# Patient Record
Sex: FEMALE | HISPANIC OR LATINO | URBAN - METROPOLITAN AREA
[De-identification: names, ages, dates, MRNs, and addresses within clinical notes are randomized per-mention and may not be internally consistent; named-entity substitution may affect disease eponyms.]

---

## 2022-03-15 ENCOUNTER — OFFICE VISIT (OUTPATIENT)
Dept: URBAN - METROPOLITAN AREA CLINIC 10 | Facility: CLINIC | Age: 67
End: 2022-03-15
Payer: MEDICAID

## 2022-03-15 DIAGNOSIS — H26.492 OTHER SECONDARY CATARACT, LEFT EYE: ICD-10-CM

## 2022-03-15 DIAGNOSIS — E11.3513 DIABETES MELLITUS TYPE 2 WITH PROLIFERATIVE RETINOPATHY WITH MACULAR EDEMA, BILATERAL: Primary | ICD-10-CM

## 2022-03-15 DIAGNOSIS — Z79.84 LONG TERM (CURRENT) USE OF ORAL HYPOGLYCEMIC DRUGS: ICD-10-CM

## 2022-03-15 PROCEDURE — 92004 COMPRE OPH EXAM NEW PT 1/>: CPT | Performed by: OPTOMETRIST

## 2022-03-15 PROCEDURE — 92134 CPTRZ OPH DX IMG PST SGM RTA: CPT | Performed by: OPTOMETRIST

## 2022-03-15 ASSESSMENT — INTRAOCULAR PRESSURE
OS: 18
OD: 18

## 2022-03-15 ASSESSMENT — VISUAL ACUITY
OD: CF 2FT
OS: CF 2FT

## 2022-03-15 NOTE — IMPRESSION/PLAN
Impression: Combined forms of age-related cataract, right eye: H25.811. Plan:  Discussed cataract diagnosis with the patient. Discussed and reviewed treatment options for cataracts. Surgical treatment is required for cataracts. Risks and benefits of surgical treatment were discussed and understood. Patient elects surgical treatment. Recommend surgery OS. Needs Retina Clearance. --Will begin cat sx planning. Standard IOL c no upgrades. --Vit heme is limiting. PPV may be indicated.

## 2022-03-15 NOTE — IMPRESSION/PLAN
Impression: Long term (current) use of oral hypoglycemic drugs: Z79.84. Plan: See above plan. Stressed importance of BG control.

## 2022-03-15 NOTE — IMPRESSION/PLAN
Impression: Other secondary cataract, left eye: H26.492. Plan: Mild PCO. No treatment currently recommended. The patient will monitor vision changes and contact us with any decrease in vision.

## 2022-03-15 NOTE — IMPRESSION/PLAN
Impression: Diabetes mellitus Type 2 with proliferative retinopathy with macular edema, bilateral: T14.9526. Plan: Pt. was receiving care in [facility] and is s/p PRP OU. Vit heme and DME OU present. Consult retina for evaluation and management.

## 2022-04-11 ENCOUNTER — OFFICE VISIT (OUTPATIENT)
Dept: URBAN - METROPOLITAN AREA CLINIC 10 | Facility: CLINIC | Age: 67
End: 2022-04-11
Payer: MEDICAID

## 2022-04-11 DIAGNOSIS — H25.811 COMBINED FORMS OF AGE-RELATED CATARACT, RIGHT EYE: ICD-10-CM

## 2022-04-11 DIAGNOSIS — Z79.84 LONG TERM (CURRENT) USE OF ORAL HYPOGLYCEMIC DRUGS: ICD-10-CM

## 2022-04-11 PROCEDURE — 99214 OFFICE O/P EST MOD 30 MIN: CPT | Performed by: OPTOMETRIST

## 2022-04-11 RX ORDER — BROMFENAC 1.03 MG/ML
0.09 % SOLUTION/ DROPS OPHTHALMIC
Qty: 15 | Refills: 3 | Status: INACTIVE
Start: 2022-04-11 | End: 2022-04-11

## 2022-04-11 ASSESSMENT — INTRAOCULAR PRESSURE
OD: 18
OS: 18

## 2022-04-11 NOTE — IMPRESSION/PLAN
Impression: Diabetes mellitus Type 2 with proliferative retinopathy with macular edema, bilateral: G20.8525. Plan: Pt. was receiving care in [facility], [place] and is s/p PRP OU. Photos today. Vit heme and DME OU present. Pt is on zebested gts, presumed for DME but pt also states helps with pain. Pt would like to continue, states improvement with gt. May use Bromfenac, Rx sent. Consult retina for evaluation and management.

## 2022-04-11 NOTE — IMPRESSION/PLAN
Impression: Diabetes mellitus Type 2 with proliferative retinopathy with macular edema, bilateral: I79.1055. Plan: Pt. was receiving care in [facility] and is s/p PRP OU. Vit heme and DME OU present. Consult retina for evaluation and management.

## 2022-04-15 ENCOUNTER — OFFICE VISIT (OUTPATIENT)
Dept: URBAN - METROPOLITAN AREA CLINIC 10 | Facility: CLINIC | Age: 67
End: 2022-04-15
Payer: MEDICAID

## 2022-04-15 DIAGNOSIS — E11.3513 TYPE 2 DIABETES MELLITUS WITH PROLIFERATIVE DIABETIC RETINOPATHY WITH MACULAR EDEMA, BILATERAL: Primary | ICD-10-CM

## 2022-04-15 PROCEDURE — 92134 CPTRZ OPH DX IMG PST SGM RTA: CPT | Performed by: OPHTHALMOLOGY

## 2022-04-15 PROCEDURE — 99204 OFFICE O/P NEW MOD 45 MIN: CPT | Performed by: OPHTHALMOLOGY

## 2022-04-15 RX ORDER — DICLOFENAC SODIUM 1 MG/ML
0.1 % SOLUTION/ DROPS OPHTHALMIC
Qty: 10 | Refills: 4 | Status: ACTIVE
Start: 2022-04-15

## 2022-04-15 NOTE — IMPRESSION/PLAN
Impression: DM2  E38.6505 LOST to f/u 6y. .. WORSE
  DME and VIT HMG OD>OS Plan: High-resolution imaging / retinal exam confirm severe proliferative DM retinopathy -- [address] NOT get injections [place] [address] Then began some injections last couple years. This is advanced disease. Injx/laser/surgery may be indicated. RETINA will provide vigilant attention to the retinal issues. Non-retinal care w primary eye team.  Pt MUST follow strict rec's for DM  care TODAY LONG d/w pt. SEVERE issue w HMG and DM
   URGED pt to control BG better. Long d/w pt in [facility] [place] TODAY Avastin OU injx. Future ND? RTC 4-6w Pos FA baseline and plan Avastin OU #2/4 AFTER multiple injx may need VIT / Laser OS and then OD OK proceed w plan Ce/IOL OD w [facility] first.

## 2022-04-15 NOTE — IMPRESSION/PLAN
Impression: Cataract, right eye: H25.811. Plan: Discussed cataract diagnosis with the patient.  OK proceed Ce/IOL OD

## 2022-05-09 ENCOUNTER — TESTING ONLY (OUTPATIENT)
Dept: URBAN - METROPOLITAN AREA CLINIC 10 | Facility: CLINIC | Age: 67
End: 2022-05-09
Payer: MEDICAID

## 2022-05-09 DIAGNOSIS — H25.811 COMBINED FORMS OF AGE-RELATED CATARACT, RIGHT EYE: ICD-10-CM

## 2022-05-09 DIAGNOSIS — Z01.818 ENCOUNTER FOR OTHER PREPROCEDURAL EXAMINATION: Primary | ICD-10-CM

## 2022-05-09 PROCEDURE — 92025 CPTRIZED CORNEAL TOPOGRAPHY: CPT | Performed by: OPHTHALMOLOGY

## 2022-05-09 PROCEDURE — 99202 OFFICE O/P NEW SF 15 MIN: CPT | Performed by: PHYSICIAN ASSISTANT

## 2022-05-10 ENCOUNTER — PRE-OPERATIVE VISIT (OUTPATIENT)
Dept: URBAN - METROPOLITAN AREA CLINIC 10 | Facility: CLINIC | Age: 67
End: 2022-05-10
Payer: MEDICAID

## 2022-05-10 DIAGNOSIS — Z79.84 LONG TERM (CURRENT) USE OF ORAL HYPOGLYCEMIC DRUGS: ICD-10-CM

## 2022-05-10 DIAGNOSIS — H26.492 OTHER SECONDARY CATARACT, LEFT EYE: ICD-10-CM

## 2022-05-10 DIAGNOSIS — H02.051 TRICHIASIS WITHOUT ENTROPION, RIGHT UPPER LID: ICD-10-CM

## 2022-05-10 PROCEDURE — 92012 INTRM OPH EXAM EST PATIENT: CPT | Performed by: OPHTHALMOLOGY

## 2022-05-10 RX ORDER — DICLOFENAC SODIUM 1 MG/ML
0.1 % SOLUTION/ DROPS OPHTHALMIC
Qty: 5 | Refills: 2 | Status: ACTIVE
Start: 2022-05-10

## 2022-05-10 RX ORDER — PREDNISOLONE ACETATE 10 MG/ML
1 % SUSPENSION/ DROPS OPHTHALMIC
Qty: 5 | Refills: 2 | Status: ACTIVE
Start: 2022-05-10

## 2022-05-10 ASSESSMENT — INTRAOCULAR PRESSURE
OS: 17
OD: 17

## 2022-05-10 ASSESSMENT — VISUAL ACUITY: OS: 20/30

## 2022-05-10 NOTE — IMPRESSION/PLAN
Impression: Diabetes mellitus Type 2 with proliferative retinopathy with macular edema, bilateral: R05.2382. Plan: Pt. was receiving care in [facility] and is s/p PRP OU. Followed by Retina - Vit heme and DME OU. Discussed diet, exercise, nutrition. Good blood sugar and blood pressure control. Maintain follow up with PCP. Discussed with patient, understands this may limit vision after surgery.

## 2022-05-10 NOTE — IMPRESSION/PLAN
Impression: Combined forms of age-related cataract, right eye: H25.811. Plan: PLAN: (( AIM -0.25 OD : 2nd eye. INJECTABLE OD  PLUS GTTS ( BDR) (DEXYCU 1st choice), NO ORA OD, NO LRI OD: irreg austyn , SPEAKS Gren[place] , s/p injections/laser OU ; Vit heme OU, retina clearance done ; LIKELY TRYPAN - POSSIBLE PRIOR TRAUMA? - PRIOR INJECTION TRACT? , Have Vitrector and Sulcus IOL available  ; NOTE LENS TYPE - [id number] )) Discussed cataract diagnosis with the patient. Appropriate testing ordered for cataract diagnosis prior to Preop. Risks and benefits of surgical treatment were discussed and understood. Patient desires surgical treatment. Premium options discussed but patient declines and is ok with using glasses after surgery. Patient desires to proceed with surgery OD, 2nd eye. Both eyes examined, medically necessary due to impact in activities of daily living. Discussed with pt the need for glasses after surgery. Discussed there is a chance of developing capsular haze after surgery, which may be corrected with laser/yag after surgery. discussed higher risk for complications due to prior injections/laser for DM. Understands Vit heme will still limit VA post sx. Discussed suspicious for prior injury right eye and higher risks        PRESENT.

## 2022-05-10 NOTE — IMPRESSION/PLAN
Impression: Trichiasis without entropion, right upper lid Plan: use ATs. none today   Discussed with patient, understands this may limit vision after surgery.

## 2022-05-17 ENCOUNTER — SURGERY (OUTPATIENT)
Dept: URBAN - METROPOLITAN AREA SURGERY 5 | Facility: SURGERY | Age: 67
End: 2022-05-17
Payer: MEDICAID

## 2022-05-17 PROCEDURE — 66982 XCAPSL CTRC RMVL CPLX WO ECP: CPT | Performed by: OPHTHALMOLOGY

## 2022-05-18 ENCOUNTER — POST-OPERATIVE VISIT (OUTPATIENT)
Dept: URBAN - METROPOLITAN AREA CLINIC 10 | Facility: CLINIC | Age: 67
End: 2022-05-18

## 2022-05-18 DIAGNOSIS — Z96.1 PRESENCE OF INTRAOCULAR LENS: Primary | ICD-10-CM

## 2022-05-18 PROCEDURE — 99024 POSTOP FOLLOW-UP VISIT: CPT | Performed by: OPTOMETRIST

## 2022-05-18 ASSESSMENT — INTRAOCULAR PRESSURE: OD: 16

## 2022-05-18 NOTE — IMPRESSION/PLAN
Impression: S/P Cataract Extraction/IOL OD - 1 Day. Presence of intraocular lens  Z96.1. Excellent post op course   Post operative instructions reviewed - Plan: RTC as scheduled retina and PO visit.  --Continue all meds

## 2022-05-20 ENCOUNTER — OFFICE VISIT (OUTPATIENT)
Dept: URBAN - METROPOLITAN AREA CLINIC 10 | Facility: CLINIC | Age: 67
End: 2022-05-20
Payer: MEDICAID

## 2022-05-20 DIAGNOSIS — E11.3513 TYPE 2 DIABETES MELLITUS WITH PROLIFERATIVE DIABETIC RETINOPATHY WITH MACULAR EDEMA, BILATERAL: Primary | ICD-10-CM

## 2022-05-20 PROCEDURE — 92235 FLUORESCEIN ANGRPH MLTIFRAME: CPT | Performed by: OPHTHALMOLOGY

## 2022-05-20 PROCEDURE — 92134 CPTRZ OPH DX IMG PST SGM RTA: CPT | Performed by: OPHTHALMOLOGY

## 2022-05-20 PROCEDURE — 92250 FUNDUS PHOTOGRAPHY W/I&R: CPT | Performed by: OPHTHALMOLOGY

## 2022-05-20 ASSESSMENT — INTRAOCULAR PRESSURE
OS: 18
OD: 13

## 2022-05-20 NOTE — IMPRESSION/PLAN
Impression: DM2  V43.5070 LOST to f/u 6y. .. WORSE DM
  DME and VIT HMG OD>OS Plan: hx: [[SEVERE PDR DM retinopathy -- DID NOT get inj for 4YR! [address] Then began some inj last couple yrs ? Advanced DM disease. RETINA will resume vigilant care Pt MUST follow strict rec's for DM  care -- RESUME injx / Retina care Apr '22]] TODAY REPEATED LONG d/w pt. SEVERE issue w HMG and DM
   TODAY Avastin OU inj (proc note). Future exam ?  
   RTC 4-6w ND/inj/OCT plan Avastin OU #3/3 THEN likely plan for VIT / Laser OD and then Vit/Laser OS

## 2022-07-05 ENCOUNTER — OFFICE VISIT (OUTPATIENT)
Dept: URBAN - METROPOLITAN AREA CLINIC 10 | Facility: CLINIC | Age: 67
End: 2022-07-05
Payer: MEDICAID

## 2022-07-05 DIAGNOSIS — E11.3513 TYPE 2 DIABETES MELLITUS WITH PROLIFERATIVE DIABETIC RETINOPATHY WITH MACULAR EDEMA, BILATERAL: Primary | ICD-10-CM

## 2022-07-05 DIAGNOSIS — H25.811 COMBINED FORMS OF AGE-RELATED CATARACT, RIGHT EYE: ICD-10-CM

## 2022-07-05 DIAGNOSIS — H43.13 VITREOUS HEMORRHAGE, BILATERAL: ICD-10-CM

## 2022-07-05 PROCEDURE — 92014 COMPRE OPH EXAM EST PT 1/>: CPT | Performed by: OPHTHALMOLOGY

## 2022-07-05 PROCEDURE — 92134 CPTRZ OPH DX IMG PST SGM RTA: CPT | Performed by: OPHTHALMOLOGY

## 2022-07-05 ASSESSMENT — INTRAOCULAR PRESSURE
OD: 13
OS: 14

## 2022-07-05 NOTE — IMPRESSION/PLAN
Impression: DM2  Q81.8338 LOST to f/u 6y. .. WORSE DM
  DME and VIT HMG OD>OS Plan: hx: [[SEVERE PDR DM retinopathy -- DID NOT get inj for 4YR! [address] Then began some inj last couple yrs ? Advanced DM disease. RETINA will resume vigilant care Pt MUST follow strict rec's for DM  care -- RESUME injx / Retina care Apr '22]] TODAY REPEATED LONG d/w pt. SEVERE issue w HMG and DM
   TODAY Avastin OU inj (proc note). DONE w #3/3 injx Summer '22    RTC plan for VIT / Laser OD ([id number])  and then Vit/Laser OS to follow

## 2022-07-05 NOTE — IMPRESSION/PLAN
Impression: Vitreous hemorrhage Plan: OD > OS DM Hmg -- DONE w  Ce/IOL OD -- OK plan VIT to remove Hmg apply Mod PRP laser -- see other plan

## 2022-07-15 ENCOUNTER — ADULT PHYSICAL (OUTPATIENT)
Dept: URBAN - METROPOLITAN AREA CLINIC 10 | Facility: CLINIC | Age: 67
End: 2022-07-15
Payer: MEDICAID

## 2022-07-15 DIAGNOSIS — Z01.818 ENCOUNTER FOR OTHER PREPROCEDURAL EXAMINATION: Primary | ICD-10-CM

## 2022-07-15 DIAGNOSIS — H43.13 VITREOUS HEMORRHAGE, BILATERAL: ICD-10-CM

## 2022-07-15 DIAGNOSIS — E11.3513 TYPE 2 DIABETES MELLITUS WITH PROLIFERATIVE DIABETIC RETINOPATHY WITH MACULAR EDEMA, BILATERAL: ICD-10-CM

## 2022-07-15 PROCEDURE — 99213 OFFICE O/P EST LOW 20 MIN: CPT | Performed by: PHYSICIAN ASSISTANT

## 2022-07-22 ENCOUNTER — POST-OPERATIVE VISIT (OUTPATIENT)
Dept: URBAN - METROPOLITAN AREA CLINIC 10 | Facility: CLINIC | Age: 67
End: 2022-07-22
Payer: MEDICAID

## 2022-07-22 DIAGNOSIS — Z48.810 ENCOUNTER FOR SURGICAL AFTERCARE FOLLOWING SURGERY ON A SENSE ORGAN: Primary | ICD-10-CM

## 2022-07-22 PROCEDURE — 99024 POSTOP FOLLOW-UP VISIT: CPT | Performed by: STUDENT IN AN ORGANIZED HEALTH CARE EDUCATION/TRAINING PROGRAM

## 2022-07-22 ASSESSMENT — INTRAOCULAR PRESSURE: OD: 11

## 2022-07-22 NOTE — IMPRESSION/PLAN
Impression: S/P Posterior Vitrectomy (PPVIT)/laser/Capsulotomy/Avastin/hmg rmvl/partaial air OD - 1 Day. Encounter for surgical aftercare following surgery on a sense organ  Z48.810. Excellent post op course   Post operative instructions reviewed - Plan: Reviewed post-op instructions. RTC if any pain or sudden changes to vision.

## 2022-08-05 ENCOUNTER — OFFICE VISIT (OUTPATIENT)
Dept: URBAN - METROPOLITAN AREA CLINIC 10 | Facility: CLINIC | Age: 67
End: 2022-08-05
Payer: MEDICAID

## 2022-08-05 DIAGNOSIS — E11.3513 TYPE 2 DIABETES MELLITUS WITH PROLIFERATIVE DIABETIC RETINOPATHY WITH MACULAR EDEMA, BILATERAL: Primary | ICD-10-CM

## 2022-08-05 DIAGNOSIS — H43.13 VITREOUS HEMORRHAGE, BILATERAL: ICD-10-CM

## 2022-08-05 PROCEDURE — 92134 CPTRZ OPH DX IMG PST SGM RTA: CPT | Performed by: OPHTHALMOLOGY

## 2022-08-05 PROCEDURE — 99024 POSTOP FOLLOW-UP VISIT: CPT | Performed by: OPHTHALMOLOGY

## 2022-08-05 ASSESSMENT — INTRAOCULAR PRESSURE
OS: 15
OD: 15

## 2022-08-05 NOTE — IMPRESSION/PLAN
Impression: DM2  H36.0800 LOST to f/u 6y. .. WORSE DM
  DME and VIT HMG OD>OS
   CLEAR s/p VIT Laser OD July '22 Plan: hx: [[SEVERE PDR DM retinopathy -- DID NOT get inj for 4YR! [address]  Then began some inj last couple yrs ? Advanced DM disease. RETINA will resume vigilant care Pt MUST follow strict rec's for DM  care -- RESUME injx / Retina care Apr '22]] REPEATED LONG d/w pt. SEVERE issue w HMG and DM -- Avastin OU prior CLEAR s/p VIT Laser OD July '22 IMPROVED OD. OK address 2nd eye w HMG OS now.   
   RTC plan for VIT / Laser/part Air/ Avastin OS to follow -- 2nd eye

## 2022-08-05 NOTE — IMPRESSION/PLAN
Impression: Vitreous hemorrhage   CLEAR s/p VIT Laser OD Plan: OD > OS DM Hmg -- DONE w  Ce/IOL OD -- OK plan VIT to remove Hmg apply Mod PRP laser -- see other plan --   CLEAR s/p VIT Laser OD July '22

## 2022-09-16 ENCOUNTER — POST-OPERATIVE VISIT (OUTPATIENT)
Dept: URBAN - METROPOLITAN AREA CLINIC 10 | Facility: CLINIC | Age: 67
End: 2022-09-16
Payer: MEDICAID

## 2022-09-16 DIAGNOSIS — Z48.810 ENCOUNTER FOR SURGICAL AFTERCARE FOLLOWING SURGERY ON A SENSE ORGAN: Primary | ICD-10-CM

## 2022-09-16 PROCEDURE — 99024 POSTOP FOLLOW-UP VISIT: CPT | Performed by: OPTOMETRIST

## 2022-09-16 ASSESSMENT — INTRAOCULAR PRESSURE: OS: 14

## 2022-09-16 NOTE — IMPRESSION/PLAN
Impression: S/P Posterior Vitrectomy (PPVIT)/laser/ capsulotomy/ partial air/ Avastin OS - 1 Day. Encounter for surgical aftercare following surgery on a sense organ  Z48.810. Plan: Reviewed nose down positioning x 24 hrs. Begin ofloxacin tid x 1 wk, pred acetate tid x 1 wk then taper weekly. RTC as scheduled c Dr. Timmy Gómez.

## 2022-10-14 ENCOUNTER — OFFICE VISIT (OUTPATIENT)
Dept: URBAN - METROPOLITAN AREA CLINIC 10 | Facility: CLINIC | Age: 67
End: 2022-10-14

## 2022-10-14 DIAGNOSIS — H43.13 VITREOUS HEMORRHAGE, BILATERAL: ICD-10-CM

## 2022-10-14 DIAGNOSIS — E11.3513 TYPE 2 DIABETES MELLITUS WITH PROLIFERATIVE DIABETIC RETINOPATHY WITH MACULAR EDEMA, BILATERAL: Primary | ICD-10-CM

## 2022-10-14 PROCEDURE — 92134 CPTRZ OPH DX IMG PST SGM RTA: CPT | Performed by: OPHTHALMOLOGY

## 2022-10-14 PROCEDURE — 99024 POSTOP FOLLOW-UP VISIT: CPT | Performed by: OPHTHALMOLOGY

## 2022-10-14 ASSESSMENT — INTRAOCULAR PRESSURE
OS: 18
OD: 15

## 2022-10-14 NOTE — IMPRESSION/PLAN
Impression: DM2  E52.7062 LOST to f/u 6y. .. WORSE DM
  DME and VIT HMG OD>OS
   CLEAR s/p VIT Laser OD July '22 Plan: hx: [[SEVERE PDR DM retinopathy -DID NOT get inj for 4YR! in [address]  Then some inj last couple yrs ? Advanced DM disease. RETINA will resume vigilant care Pt MUST follow strict rec's for DM  care -- RESUME inj / Retina care Apr '22]] REPEATED LONG d/w pt. SEVERE issue w HMG and DM -- Avastin OU prior CLEAR s/p VIT Laser OD July '22 then VIT Lsr [facility] Sep '22 IMPROVED OD. IMPROVED OS -- PT THRILLED and grateful. TODAY postop   NO CME postop -- IMPROVED. Finish Gtts. RETINA f/u PRN -- F/u Gen eye care 4-6w.

## 2022-10-14 NOTE — IMPRESSION/PLAN
Impression: Vitreous hemorrhage   CLEAR s/p VIT Laser OD / OS Plan: OD > OS DM Hmg -- DONE w  Ce/IOL OD -- OK plan VIT to remove Hmg apply Mod PRP laser -- see other plan --   CLEAR s/p VIT Laser OD July '22  and OS Sep '22

## 2022-11-11 ENCOUNTER — OFFICE VISIT (OUTPATIENT)
Dept: URBAN - METROPOLITAN AREA CLINIC 10 | Facility: CLINIC | Age: 67
End: 2022-11-11
Payer: MEDICAID

## 2022-11-11 DIAGNOSIS — E11.3513 TYPE 2 DIABETES MELLITUS WITH PROLIFERATIVE DIABETIC RETINOPATHY WITH MACULAR EDEMA, BILATERAL: Primary | ICD-10-CM

## 2022-11-11 DIAGNOSIS — H02.051 TRICHIASIS WITHOUT ENTROPION, RIGHT UPPER LID: ICD-10-CM

## 2022-11-11 PROCEDURE — 99213 OFFICE O/P EST LOW 20 MIN: CPT | Performed by: OPTOMETRIST

## 2022-11-11 ASSESSMENT — INTRAOCULAR PRESSURE
OS: 15
OD: 10

## 2022-11-11 ASSESSMENT — VISUAL ACUITY
OD: 20/50
OS: 20/20

## 2022-11-11 NOTE — IMPRESSION/PLAN
Impression: DM2  V99.1294 LOST to f/u 6y. .. WORSE DM
  DME and VIT HMG OD>OS
   CLEAR s/p VIT Laser OD July '22 Plan: hx: [[SEVERE PDR DM retinopathy -DID NOT get inj for 4YR! in [address]  Then some inj last couple yrs ? Advanced DM disease. RETINA will resume vigilant care Pt MUST follow strict rec's for DM  care -- RESUME inj / Retina care Apr '22]] Stable and improved. Pt. finished PO gtts. and will use OTC readers. RTC 3-4 months for complete and order Mac OCT.

## 2022-12-08 ENCOUNTER — OFFICE VISIT (OUTPATIENT)
Dept: URBAN - METROPOLITAN AREA CLINIC 10 | Facility: CLINIC | Age: 67
End: 2022-12-08
Payer: MEDICAID

## 2022-12-08 DIAGNOSIS — E11.3513 TYPE 2 DIABETES MELLITUS WITH PROLIFERATIVE DIABETIC RETINOPATHY WITH MACULAR EDEMA, BILATERAL: Primary | ICD-10-CM

## 2022-12-08 PROCEDURE — 92134 CPTRZ OPH DX IMG PST SGM RTA: CPT | Performed by: OPTOMETRIST

## 2022-12-08 ASSESSMENT — INTRAOCULAR PRESSURE
OD: 16
OS: 14

## 2022-12-08 ASSESSMENT — VISUAL ACUITY: OS: 20/20

## 2022-12-08 NOTE — IMPRESSION/PLAN
Impression: DM2  I82.6631 LOST to f/u 6y. .. WORSE DM
  DME and VIT HMG OD>OS
   s/p VIT Laser OD July '22 Plan: hx: [[SEVERE PDR DM retinopathy -DID NOT get inj for 4YR! [address] Then some inj last couple yrs ? Advanced DM disease. RETINA will resume vigilant care Pt MUST follow strict rec's for DM  care -- RESUME inj / Retina care Apr '22]] Recurring vit heme OD. Onset of symptoms x 4 days. Will monitor for clearing now s/p PPV c PRP. Consult Dr. Arelis Costa in 2 weeks for f/u.

## 2023-01-31 ENCOUNTER — OFFICE VISIT (OUTPATIENT)
Dept: URBAN - METROPOLITAN AREA CLINIC 10 | Facility: CLINIC | Age: 68
End: 2023-01-31
Payer: MEDICAID

## 2023-01-31 DIAGNOSIS — H43.13 VITREOUS HEMORRHAGE, BILATERAL: ICD-10-CM

## 2023-01-31 DIAGNOSIS — E11.3513 TYPE 2 DIABETES MELLITUS WITH PROLIFERATIVE DIABETIC RETINOPATHY WITH MACULAR EDEMA, BILATERAL: Primary | ICD-10-CM

## 2023-01-31 PROCEDURE — 92134 CPTRZ OPH DX IMG PST SGM RTA: CPT | Performed by: OPHTHALMOLOGY

## 2023-01-31 PROCEDURE — 92014 COMPRE OPH EXAM EST PT 1/>: CPT | Performed by: OPHTHALMOLOGY

## 2023-01-31 ASSESSMENT — INTRAOCULAR PRESSURE
OD: 12
OS: 13

## 2023-01-31 NOTE — IMPRESSION/PLAN
Impression: Vitreous hemorrhage CLEAR s/p VIT Laser OD / OS Plan: OD > OS DM Hmg -- DONE w  Ce/IOL OD -- OK plan VIT to remove Hmg apply Mod PRP laser -- see other plan --   CLEAR s/p VIT Laser OD July '22  and OS Sep '22 -- NO RECUR HMG now on repeated exam.  Reassured pt.

## 2023-01-31 NOTE — IMPRESSION/PLAN
Impression: DM2 -- LOST to f/u 6y. .. WORSE DM
 DME and VIT HMG OD>OS
 CLEAR s/p VIT Laser OD Jul '22 Plan: hx: [[SEVERE PDR DM retinopathy -DID NOT get inj for 4YR! in [address]  Then some inj last couple yrs ? Advanced DM disease. RETINA will resume vigilant care Pt MUST follow strict rec's for DM  care -- RESUME inj / Retina care Apr '22]] REPEATED LONG d/w pt. SEVERE issue w HMG and DM -- Avastin OU prior CLEAR s/p VIT Lsr OD Jul '22 then VIT Lsr OS Sep '22 IMPROVED OD. IMPROVED OS -- PT THRILLED and grateful. TODAY added back on w c/o LOV (but now recovered). NO DME / CME postop -- IMPROVED. No injx / laser.     
  Reassured pt --  RETINA f/u PRN again -- F/u Gen eye care 6w

## 2023-03-23 ENCOUNTER — OFFICE VISIT (OUTPATIENT)
Dept: URBAN - METROPOLITAN AREA CLINIC 10 | Facility: CLINIC | Age: 68
End: 2023-03-23
Payer: MEDICARE

## 2023-03-23 DIAGNOSIS — H43.13 VITREOUS HEMORRHAGE, BILATERAL: Primary | ICD-10-CM

## 2023-03-23 DIAGNOSIS — E11.3513 TYPE 2 DIABETES MELLITUS WITH PROLIFERATIVE DIABETIC RETINOPATHY WITH MACULAR EDEMA, BILATERAL: ICD-10-CM

## 2023-03-23 PROCEDURE — 99213 OFFICE O/P EST LOW 20 MIN: CPT | Performed by: OPTOMETRIST

## 2023-03-23 ASSESSMENT — INTRAOCULAR PRESSURE
OS: 14
OD: 14

## 2023-03-23 NOTE — IMPRESSION/PLAN
Impression: Vitreous hemorrhage CLEAR s/p VIT Laser OD / OS Plan: Recurring vit heme OS>>OD. Pt. is s/p PPV c PRP OU. Recent h/o CVA 2/6/23. Vit heme obscuring view of any detail.  reports MRI done, CVA confirmed. Seen by neurology as well and  reports neurology specifically noted CVA did not have evidence of occipital infarct. Will consult retina again, Dr. Nena Salazar 1 week for eval/bscan.

## 2023-03-23 NOTE — IMPRESSION/PLAN
Impression: DM2 -- LOST to f/u 6y. .. WORSE DM
 DME and VIT HMG OD>OS
 CLEAR s/p VIT Laser OD Jul '22 Plan: hx: [[SEVERE PDR DM retinopathy -DID NOT get inj for 4YR! [address] Then some inj last couple yrs ? Advanced DM disease. RETINA will resume vigilant care Pt MUST follow strict rec's for DM  care -- RESUME inj / Retina care Apr '22]] See above. New vit heme OU.

## 2023-03-27 ENCOUNTER — OFFICE VISIT (OUTPATIENT)
Dept: URBAN - METROPOLITAN AREA CLINIC 10 | Facility: CLINIC | Age: 68
End: 2023-03-27
Payer: MEDICARE

## 2023-03-27 DIAGNOSIS — I63.9 STROKE: ICD-10-CM

## 2023-03-27 DIAGNOSIS — E11.3513 TYPE 2 DIABETES MELLITUS WITH PROLIFERATIVE DIABETIC RETINOPATHY WITH MACULAR EDEMA, BILATERAL: Primary | ICD-10-CM

## 2023-03-27 DIAGNOSIS — I69.398 OTHER SEQUELAE OF CEREBRAL INFARCTION: ICD-10-CM

## 2023-03-27 PROCEDURE — 92134 CPTRZ OPH DX IMG PST SGM RTA: CPT | Performed by: OPHTHALMOLOGY

## 2023-03-27 PROCEDURE — 76512 OPH US DX B-SCAN: CPT | Performed by: OPHTHALMOLOGY

## 2023-03-27 PROCEDURE — 99214 OFFICE O/P EST MOD 30 MIN: CPT | Performed by: OPHTHALMOLOGY

## 2023-03-27 ASSESSMENT — INTRAOCULAR PRESSURE
OD: 14
OS: 17

## 2023-03-27 NOTE — IMPRESSION/PLAN
Impression: Stroke: I63.9. Plan: right side paralysis, in hospital Feb 2023. ARM and LEG. Hospital 3 days.  Neurology doctor should clear for outpatient surgery, IV mild sedation only for POSTERIOR VITRECTOMY , DR CASTANEDA, surgery time 30 minutes

## 2023-04-21 ENCOUNTER — OFFICE VISIT (OUTPATIENT)
Dept: URBAN - METROPOLITAN AREA CLINIC 10 | Facility: CLINIC | Age: 68
End: 2023-04-21
Payer: MEDICARE

## 2023-04-21 DIAGNOSIS — I63.9 STROKE: ICD-10-CM

## 2023-04-21 DIAGNOSIS — H43.13 VITREOUS HEMORRHAGE, BILATERAL: ICD-10-CM

## 2023-04-21 DIAGNOSIS — E11.3513 TYPE 2 DIABETES MELLITUS WITH PROLIFERATIVE DIABETIC RETINOPATHY WITH MACULAR EDEMA, BILATERAL: Primary | ICD-10-CM

## 2023-04-21 PROCEDURE — 99214 OFFICE O/P EST MOD 30 MIN: CPT | Performed by: OPHTHALMOLOGY

## 2023-04-21 PROCEDURE — 92134 CPTRZ OPH DX IMG PST SGM RTA: CPT | Performed by: OPHTHALMOLOGY

## 2023-04-21 PROCEDURE — 67028 INJECTION EYE DRUG: CPT | Performed by: OPHTHALMOLOGY

## 2023-04-21 ASSESSMENT — INTRAOCULAR PRESSURE
OS: 22
OD: 13

## 2023-04-21 NOTE — IMPRESSION/PLAN
Impression: DM2 -- LOST to f/u 6y. .. WORSE DM
 DME and VIT HMG OD>OS
 CLEAR s/p VIT Laser OD Jul '22 RECUR HMG OS '23 w her CVA Plan: hx: [[SEVERE PDR DM retinopathy -DID NOT get inj for 4YR! [address] Then some inj last couple yrs ? Advanced DM disease. RETINA will resume vigilant care Pt MUST follow strict rec's for DM  care -- RESUME inj / Retina care Apr '22]] REPEATED LONG d/w pt. SEVERE issue w HMG and DM -- Avastin OU prior CLEAR s/p VIT Lsr OD Jul '22 then VIT Lsr OS Sep '22 IMPROVED OD. IMPROVED OS -- PT THRILLED and grateful. APR '23 added back w HMG /LOV OS (stable OD). . .  TODAY add Avst OS (proc note) MAY NEED ONGOING INJ to MAINTAIN /PREVENT RECUR HMG as per DRCr. net Appeal for Vabysmo - FAILED Avastin.   PLAN VIT for OS (See other plan)

## 2023-04-21 NOTE — IMPRESSION/PLAN
Impression: Vit HMG CLEAR s/p VIT Lsr OD / OS -- RECUR OD '22 / RECUR OS '23 Plan: OD > OS DM Hmg --  -- Prior VIT Lsr OD /OS -- s/p VIT Lsr OD Jul '22  and OS Sep '22 -- WAS EXCELLENT - but POOR care and RECUR HMG OD '22 / OS '23 Appeal for Vabysmo - FAILED Avastin. PLAN VIT for OS (See other plan) MOVE to REPEATED VIT again OS (for recur hmg '23)      Plan VIT / Rmv'l HMG OS / PRP Fill-in/Partial Afx/Avast / Aleida Novel OS

## 2023-04-21 NOTE — IMPRESSION/PLAN
Impression: Stroke: I63.9. Plan: right side paralysis, in hospital Feb 2023. ARM and LEG. Hospital 3 days. Neurology doctor care now.

## 2023-06-02 ENCOUNTER — ADULT PHYSICAL (OUTPATIENT)
Dept: URBAN - METROPOLITAN AREA CLINIC 10 | Facility: CLINIC | Age: 68
End: 2023-06-02
Payer: MEDICARE

## 2023-06-02 DIAGNOSIS — Z01.818 ENCOUNTER FOR OTHER PREPROCEDURAL EXAMINATION: Primary | ICD-10-CM

## 2023-06-02 DIAGNOSIS — E11.3513 TYPE 2 DIABETES MELLITUS WITH PROLIFERATIVE DIABETIC RETINOPATHY WITH MACULAR EDEMA, BILATERAL: ICD-10-CM

## 2023-06-02 DIAGNOSIS — H43.13 VITREOUS HEMORRHAGE, BILATERAL: ICD-10-CM

## 2023-06-02 PROCEDURE — 99213 OFFICE O/P EST LOW 20 MIN: CPT | Performed by: PHYSICIAN ASSISTANT

## 2023-06-09 ENCOUNTER — POST-OPERATIVE VISIT (OUTPATIENT)
Dept: URBAN - METROPOLITAN AREA CLINIC 10 | Facility: CLINIC | Age: 68
End: 2023-06-09
Payer: MEDICARE

## 2023-06-09 DIAGNOSIS — Z48.810 ENCOUNTER FOR SURGICAL AFTERCARE FOLLOWING SURGERY ON A SENSE ORGAN: Primary | ICD-10-CM

## 2023-06-09 PROCEDURE — 99024 POSTOP FOLLOW-UP VISIT: CPT | Performed by: STUDENT IN AN ORGANIZED HEALTH CARE EDUCATION/TRAINING PROGRAM

## 2023-06-09 ASSESSMENT — INTRAOCULAR PRESSURE: OS: 17

## 2023-06-23 ENCOUNTER — OFFICE VISIT (OUTPATIENT)
Dept: URBAN - METROPOLITAN AREA CLINIC 10 | Facility: CLINIC | Age: 68
End: 2023-06-23
Payer: MEDICARE

## 2023-06-23 DIAGNOSIS — H43.13 VITREOUS HEMORRHAGE, BILATERAL: ICD-10-CM

## 2023-06-23 DIAGNOSIS — I63.9 STROKE: ICD-10-CM

## 2023-06-23 DIAGNOSIS — E11.3513 TYPE 2 DIABETES MELLITUS WITH PROLIFERATIVE DIABETIC RETINOPATHY WITH MACULAR EDEMA, BILATERAL: Primary | ICD-10-CM

## 2023-06-23 PROCEDURE — 99024 POSTOP FOLLOW-UP VISIT: CPT | Performed by: OPHTHALMOLOGY

## 2023-06-23 PROCEDURE — 92134 CPTRZ OPH DX IMG PST SGM RTA: CPT | Performed by: OPHTHALMOLOGY

## 2023-06-23 ASSESSMENT — INTRAOCULAR PRESSURE
OD: 18
OS: 25

## 2023-06-23 NOTE — IMPRESSION/PLAN
Impression: Vit HMG CLEAR s/p VIT Lsr OD / OS -- RECUR OD '22 / RECUR OS '23 (repeated VIT '23 OS) Plan: hx: [[OD > OS DM Hmg --  -- Prior VIT Lsr OD /OS -- s/p VIT Lsr OD Jul '22  and OS Sep '22 -- WAS EXCELLENT - but POOR care and RECUR HMG OD '22 / OS '23 -- REPEATED VIT peel OS Jun '23 -- URGED BETTER BG - Appeal Vabysmo - FAILED Avstn]]] Now resolved floaters -- POW #2 s/p REPEATED VIT OS (w Peel) for RECUR [facility] w DM PDR -- prior VIT OS Fall '22. Pt is striving to impv BG. DONE Jun '23 VIT / Rmv'l HMG OS / PRP Fill-in/Partial Afx/Avast OS Idania Chin TODAY postop   NO CME / DME postop -- IMPROVED. Finish Gtts. RETINA f/u 6-8w RESUME maintain inj Vabysmo OU  - F/u Gen eye care 4-6w.

## 2023-06-23 NOTE — IMPRESSION/PLAN
Impression: Stroke: I63.9. Plan: STORM LUND - [facility] Feb 2023 w right side paralysis ARM / LEG.    Neurology doctor care

## 2023-06-23 NOTE — IMPRESSION/PLAN
Impression: Vitreous hemorrhage, bilateral: H43.13. Plan: OD  > OS DM Hmg.   DONE w Ce/IOL -- VIT DONE OU '22 (repeated VIT OS '23)

## 2024-09-17 NOTE — IMPRESSION/PLAN
Excision Cyst Eyelid (B) Operative Note     Date: 2024  OR Location: [facility]    Name: Karen Serrano : 2007, Age: 17 y.o., MRN: [id number], Sex: female    Diagnosis  Pre-op Diagnosis      * Chalazion of both eyes [H00.13, H00.16] Post-op Diagnosis     * Chalazion of both eyes [H00.13, H00.16]     Procedures  1- Chalazion incision and drainage, upper lids both eyes    Surgeons      * Abad Greene - Primary    Resident/Fellow/Other Assistant:  Surgeons and Role:     * Shaheed Hernandez MD - Resident - Assisting    Procedure Summary  Anesthesia: Anesthesia type not filed in the log.  ASA: I  Anesthesia Staff: Anesthesiologist: Brittani Tay MD  Anesthesia Resident: Amparo Rondon DO    Estimated Blood Loss: < 5 mL  Intra-op Medications: Administrations occurring from 1220 to 1320 on 24:  * No intraprocedure medications in log *           Anesthesia Record               Intraprocedure I/O Totals          Intake    LR bolus 1000.00 mL    Total Intake 1000 mL          Specimen: No specimens collected     Staff:   Circulator: Marielos Noyola Person: Namita  Circulator: Agustina    Drains and/or Catheters: * None in log *    Findings: 2 chalazion left upper eyelid centrally, 1 minimal chalazion right upper eyelid    Indications:   Karen Serrano is an 17 y.o. female who is having surgery for Chalazion of both eyes [H00.13, H00.16].   The patient and family were seen in the preoperative area.   The risks, benefits, complications, treatment options, non-operative alternatives, expected recovery and outcomes were discussed.  The family concurred with the proposed plan, giving informed consent.    The patient has been actively warmed in preoperative area.   Preoperative antibiotics are not indicated.   Venous thrombosis prophylaxis are not indicated.    Procedure Details:   The patient was brought to the operating room and was placed in a supine position.   After the patient was positively identified  Impression: Type 2 diabetes mellitus with proliferative diabetic retinopathy with macular edema, bilateral: V29.4047. Plan: both have [facility] with attached retina. plan  needs bilateral PPVIT AND PRP, DR CASTANEDA.   Left eye first, see DR CASTANEDA in clinic first through a typical time-out procedure, the patient received anesthesia and an LMA.   Then both eyes were prepped and draped in the usual sterile ophthalmic fashion.   Attention was then directed to the right upper eyelid. There were 2 chalazia centrally measuring ~ 3 and 5 mms. A chalazion clamp was applied to the lid. A small, vertical incision was made in the palpebral conjunctiva overlying the chalazion. The content was expressed with the aid of a curette. Hemostasis was obtained with pressure and bipolar cauterization.The same procedure was performed for the other chalazion.    Attention was then directed to the left upper eyelid where there was one chalazion centrally measuring ~ 1 mms. A chalazion clamp was applied to the lid. A small, vertical incision was made in the palpebral conjunctiva overlying the chalazion. The content was expressed with the aid of a curette. Hemostasis was obtained with pressure and bipolar cauterization.  Meibomian glands were then expressed manually.   All procedures were completed without complications.  At the end, the eye was cleaned. 5% betadine eye solution and Maxitrol ointment were instilled in the eyes.  The patient was then awakened and the LMA was removed.   The patient was transferred to the recover room in good and stable condition.   The patient tolerated the procedure and the anesthesia well.    Complications:  None; patient tolerated the procedure well.      Disposition: PACU - hemodynamically stable.    Condition: stable     Attending Attestation: I was present and scrubbed for the entire procedure.    Abad Jc  Phone Number: [phone]